# Patient Record
Sex: MALE | Race: WHITE | URBAN - METROPOLITAN AREA
[De-identification: names, ages, dates, MRNs, and addresses within clinical notes are randomized per-mention and may not be internally consistent; named-entity substitution may affect disease eponyms.]

---

## 2019-12-03 ENCOUNTER — PROCEDURE VISIT (OUTPATIENT)
Dept: SURGERY | Age: 64
End: 2019-12-03
Payer: MEDICARE

## 2019-12-03 VITALS
OXYGEN SATURATION: 98 % | BODY MASS INDEX: 24.29 KG/M2 | DIASTOLIC BLOOD PRESSURE: 83 MMHG | SYSTOLIC BLOOD PRESSURE: 161 MMHG | HEART RATE: 65 BPM | WEIGHT: 164 LBS | HEIGHT: 69 IN | TEMPERATURE: 97.8 F

## 2019-12-03 DIAGNOSIS — C44.319 BASAL CELL CARCINOMA OF FOREHEAD: Primary | ICD-10-CM

## 2019-12-03 PROBLEM — I10 HYPERTENSIVE DISORDER: Status: ACTIVE | Noted: 2019-12-03

## 2019-12-03 PROBLEM — F41.9 ANXIETY: Status: ACTIVE | Noted: 2019-12-03

## 2019-12-03 PROCEDURE — 17311 MOHS 1 STAGE H/N/HF/G: CPT | Performed by: DERMATOLOGY

## 2019-12-03 PROCEDURE — 12052 INTMD RPR FACE/MM 2.6-5.0 CM: CPT | Performed by: DERMATOLOGY

## 2019-12-03 RX ORDER — LISINOPRIL 20 MG/1
TABLET ORAL
Refills: 1 | COMMUNITY
Start: 2019-11-21

## 2019-12-04 ENCOUNTER — TELEPHONE (OUTPATIENT)
Dept: SURGERY | Age: 64
End: 2019-12-04

## 2023-10-16 ENCOUNTER — HOSPITAL ENCOUNTER (OUTPATIENT)
Dept: ULTRASOUND IMAGING | Age: 68
Discharge: HOME | End: 2023-10-16
Payer: MEDICARE

## 2023-10-16 DIAGNOSIS — R17 CHOLEMIA: ICD-10-CM

## 2023-10-16 PROCEDURE — 76700 US EXAM ABDOM COMPLETE: CPT

## 2024-06-06 ENCOUNTER — ANESTHESIA (OUTPATIENT)
Dept: OPERATING ROOM | Age: 69
End: 2024-06-06
Payer: MEDICARE

## 2024-06-06 ENCOUNTER — ANESTHESIA EVENT (OUTPATIENT)
Dept: OPERATING ROOM | Age: 69
End: 2024-06-06
Payer: MEDICARE

## 2024-06-06 ENCOUNTER — APPOINTMENT (OUTPATIENT)
Dept: CT IMAGING | Age: 69
DRG: 398 | End: 2024-06-06
Payer: MEDICARE

## 2024-06-06 ENCOUNTER — HOSPITAL ENCOUNTER (INPATIENT)
Age: 69
LOS: 2 days | Discharge: HOME | DRG: 398 | End: 2024-06-08
Attending: STUDENT IN AN ORGANIZED HEALTH CARE EDUCATION/TRAINING PROGRAM | Admitting: SURGERY
Payer: MEDICARE

## 2024-06-06 DIAGNOSIS — K35.200 ACUTE APPENDICITIS WITH GENERALIZED PERITONITIS WITHOUT GANGRENE, PERFORATION, OR ABSCESS: Primary | ICD-10-CM

## 2024-06-06 DIAGNOSIS — K35.30 ACUTE APPENDICITIS WITH LOCALIZED PERITONITIS, WITHOUT PERFORATION, ABSCESS, OR GANGRENE: ICD-10-CM

## 2024-06-06 DIAGNOSIS — E87.1 HYPONATREMIA: ICD-10-CM

## 2024-06-06 DIAGNOSIS — R33.9 URINARY RETENTION: ICD-10-CM

## 2024-06-06 PROBLEM — K35.32 ACUTE PERFORATED APPENDICITIS: Status: ACTIVE | Noted: 2024-06-06

## 2024-06-06 PROBLEM — K35.33 ACUTE APPENDICITIS WITH PERFORATION, LOCALIZED PERITONITIS, ABSCESS, AND GANGRENE: Status: ACTIVE | Noted: 2024-06-06

## 2024-06-06 LAB
ALBUMIN SERPL-MCNC: 4 G/DL (ref 3.4–5)
ALBUMIN/GLOB SERPL: 1.5 {RATIO} (ref 1.1–2.2)
ALP SERPL-CCNC: 50 U/L (ref 40–129)
ALT SERPL-CCNC: 14 U/L (ref 10–40)
ANION GAP SERPL CALCULATED.3IONS-SCNC: 8 MMOL/L (ref 3–16)
AST SERPL-CCNC: 20 U/L (ref 15–37)
BASOPHILS # BLD: 0 K/UL (ref 0–0.2)
BASOPHILS NFR BLD: 0.2 %
BILIRUB SERPL-MCNC: 1 MG/DL (ref 0–1)
BILIRUB UR QL STRIP.AUTO: NEGATIVE
BUN SERPL-MCNC: 11 MG/DL (ref 7–20)
CALCIUM SERPL-MCNC: 9 MG/DL (ref 8.3–10.6)
CHLORIDE SERPL-SCNC: 91 MMOL/L (ref 99–110)
CLARITY UR: CLEAR
CO2 SERPL-SCNC: 28 MMOL/L (ref 21–32)
COLOR UR: YELLOW
CREAT SERPL-MCNC: 0.9 MG/DL (ref 0.8–1.3)
DEPRECATED RDW RBC AUTO: 12.4 % (ref 12.4–15.4)
EOSINOPHIL # BLD: 0 K/UL (ref 0–0.6)
EOSINOPHIL NFR BLD: 0 %
GFR SERPLBLD CREATININE-BSD FMLA CKD-EPI: >90 ML/MIN/{1.73_M2}
GLUCOSE SERPL-MCNC: 112 MG/DL (ref 70–99)
GLUCOSE UR STRIP.AUTO-MCNC: NEGATIVE MG/DL
HCT VFR BLD AUTO: 35.9 % (ref 40.5–52.5)
HGB BLD-MCNC: 12.5 G/DL (ref 13.5–17.5)
HGB UR QL STRIP.AUTO: NEGATIVE
KETONES UR STRIP.AUTO-MCNC: ABNORMAL MG/DL
LEUKOCYTE ESTERASE UR QL STRIP.AUTO: NEGATIVE
LYMPHOCYTES # BLD: 0.3 K/UL (ref 1–5.1)
LYMPHOCYTES NFR BLD: 2.3 %
MCH RBC QN AUTO: 34 PG (ref 26–34)
MCHC RBC AUTO-ENTMCNC: 34.8 G/DL (ref 31–36)
MCV RBC AUTO: 97.9 FL (ref 80–100)
MONOCYTES # BLD: 1.3 K/UL (ref 0–1.3)
MONOCYTES NFR BLD: 10 %
NEUTROPHILS # BLD: 11.5 K/UL (ref 1.7–7.7)
NEUTROPHILS NFR BLD: 87.5 %
NITRITE UR QL STRIP.AUTO: NEGATIVE
PH UR STRIP.AUTO: 6.5 [PH] (ref 5–8)
PLATELET # BLD AUTO: 139 K/UL (ref 135–450)
PMV BLD AUTO: 7.2 FL (ref 5–10.5)
POTASSIUM SERPL-SCNC: 4.2 MMOL/L (ref 3.5–5.1)
PROT SERPL-MCNC: 6.6 G/DL (ref 6.4–8.2)
PROT UR STRIP.AUTO-MCNC: ABNORMAL MG/DL
RBC # BLD AUTO: 3.66 M/UL (ref 4.2–5.9)
RBC #/AREA URNS HPF: NORMAL /HPF (ref 0–4)
SODIUM SERPL-SCNC: 127 MMOL/L (ref 136–145)
SP GR UR STRIP.AUTO: 1.01 (ref 1–1.03)
UA COMPLETE W REFLEX CULTURE PNL UR: ABNORMAL
UA DIPSTICK W REFLEX MICRO PNL UR: YES
URN SPEC COLLECT METH UR: ABNORMAL
UROBILINOGEN UR STRIP-ACNC: 0.2 E.U./DL
WBC # BLD AUTO: 13.1 K/UL (ref 4–11)
WBC #/AREA URNS HPF: NORMAL /HPF (ref 0–5)

## 2024-06-06 PROCEDURE — 96375 TX/PRO/DX INJ NEW DRUG ADDON: CPT

## 2024-06-06 PROCEDURE — 2580000003 HC RX 258: Performed by: STUDENT IN AN ORGANIZED HEALTH CARE EDUCATION/TRAINING PROGRAM

## 2024-06-06 PROCEDURE — 99285 EMERGENCY DEPT VISIT HI MDM: CPT

## 2024-06-06 PROCEDURE — 7100000001 HC PACU RECOVERY - ADDTL 15 MIN: Performed by: SURGERY

## 2024-06-06 PROCEDURE — 96365 THER/PROPH/DIAG IV INF INIT: CPT

## 2024-06-06 PROCEDURE — 2580000003 HC RX 258: Performed by: PHYSICIAN ASSISTANT

## 2024-06-06 PROCEDURE — 74177 CT ABD & PELVIS W/CONTRAST: CPT

## 2024-06-06 PROCEDURE — 96376 TX/PRO/DX INJ SAME DRUG ADON: CPT

## 2024-06-06 PROCEDURE — 2580000003 HC RX 258

## 2024-06-06 PROCEDURE — 2500000003 HC RX 250 WO HCPCS

## 2024-06-06 PROCEDURE — 44970 LAPAROSCOPY APPENDECTOMY: CPT | Performed by: SURGERY

## 2024-06-06 PROCEDURE — 3600000004 HC SURGERY LEVEL 4 BASE: Performed by: SURGERY

## 2024-06-06 PROCEDURE — 51702 INSERT TEMP BLADDER CATH: CPT

## 2024-06-06 PROCEDURE — 2580000003 HC RX 258: Performed by: SURGERY

## 2024-06-06 PROCEDURE — 94761 N-INVAS EAR/PLS OXIMETRY MLT: CPT

## 2024-06-06 PROCEDURE — 96361 HYDRATE IV INFUSION ADD-ON: CPT

## 2024-06-06 PROCEDURE — 85025 COMPLETE CBC W/AUTO DIFF WBC: CPT

## 2024-06-06 PROCEDURE — 2720000010 HC SURG SUPPLY STERILE: Performed by: SURGERY

## 2024-06-06 PROCEDURE — A4217 STERILE WATER/SALINE, 500 ML: HCPCS | Performed by: SURGERY

## 2024-06-06 PROCEDURE — 80053 COMPREHEN METABOLIC PANEL: CPT

## 2024-06-06 PROCEDURE — 6360000002 HC RX W HCPCS: Performed by: SURGERY

## 2024-06-06 PROCEDURE — 3600000014 HC SURGERY LEVEL 4 ADDTL 15MIN: Performed by: SURGERY

## 2024-06-06 PROCEDURE — 2700000000 HC OXYGEN THERAPY PER DAY

## 2024-06-06 PROCEDURE — 3700000001 HC ADD 15 MINUTES (ANESTHESIA): Performed by: SURGERY

## 2024-06-06 PROCEDURE — 2709999900 HC NON-CHARGEABLE SUPPLY: Performed by: SURGERY

## 2024-06-06 PROCEDURE — 3700000000 HC ANESTHESIA ATTENDED CARE: Performed by: SURGERY

## 2024-06-06 PROCEDURE — 81001 URINALYSIS AUTO W/SCOPE: CPT

## 2024-06-06 PROCEDURE — 99222 1ST HOSP IP/OBS MODERATE 55: CPT | Performed by: SURGERY

## 2024-06-06 PROCEDURE — 88304 TISSUE EXAM BY PATHOLOGIST: CPT

## 2024-06-06 PROCEDURE — 2500000003 HC RX 250 WO HCPCS: Performed by: SURGERY

## 2024-06-06 PROCEDURE — 6360000002 HC RX W HCPCS

## 2024-06-06 PROCEDURE — 7100000000 HC PACU RECOVERY - FIRST 15 MIN: Performed by: SURGERY

## 2024-06-06 PROCEDURE — 1200000000 HC SEMI PRIVATE

## 2024-06-06 PROCEDURE — 6360000004 HC RX CONTRAST MEDICATION: Performed by: PHYSICIAN ASSISTANT

## 2024-06-06 PROCEDURE — 6360000002 HC RX W HCPCS: Performed by: PHYSICIAN ASSISTANT

## 2024-06-06 PROCEDURE — 6360000002 HC RX W HCPCS: Performed by: ANESTHESIOLOGY

## 2024-06-06 RX ORDER — HEPARIN SODIUM 5000 [USP'U]/ML
5000 INJECTION, SOLUTION INTRAVENOUS; SUBCUTANEOUS ONCE
Status: COMPLETED | OUTPATIENT
Start: 2024-06-06 | End: 2024-06-06

## 2024-06-06 RX ORDER — PROPOFOL 10 MG/ML
INJECTION, EMULSION INTRAVENOUS PRN
Status: DISCONTINUED | OUTPATIENT
Start: 2024-06-06 | End: 2024-06-06 | Stop reason: SDUPTHER

## 2024-06-06 RX ORDER — LIDOCAINE HYDROCHLORIDE 20 MG/ML
INJECTION, SOLUTION INFILTRATION; PERINEURAL PRN
Status: DISCONTINUED | OUTPATIENT
Start: 2024-06-06 | End: 2024-06-06 | Stop reason: SDUPTHER

## 2024-06-06 RX ORDER — OXYCODONE HYDROCHLORIDE 5 MG/1
10 TABLET ORAL PRN
Status: DISCONTINUED | OUTPATIENT
Start: 2024-06-06 | End: 2024-06-06 | Stop reason: HOSPADM

## 2024-06-06 RX ORDER — ONDANSETRON 2 MG/ML
4 INJECTION INTRAMUSCULAR; INTRAVENOUS EVERY 4 HOURS PRN
Status: DISCONTINUED | OUTPATIENT
Start: 2024-06-06 | End: 2024-06-08 | Stop reason: HOSPADM

## 2024-06-06 RX ORDER — DIPHENHYDRAMINE HYDROCHLORIDE 50 MG/ML
6.25 INJECTION INTRAMUSCULAR; INTRAVENOUS
Status: DISCONTINUED | OUTPATIENT
Start: 2024-06-06 | End: 2024-06-06 | Stop reason: HOSPADM

## 2024-06-06 RX ORDER — BUPIVACAINE HYDROCHLORIDE AND EPINEPHRINE 5; 5 MG/ML; UG/ML
INJECTION, SOLUTION PERINEURAL PRN
Status: DISCONTINUED | OUTPATIENT
Start: 2024-06-06 | End: 2024-06-06 | Stop reason: ALTCHOICE

## 2024-06-06 RX ORDER — ROCURONIUM BROMIDE 10 MG/ML
INJECTION, SOLUTION INTRAVENOUS PRN
Status: DISCONTINUED | OUTPATIENT
Start: 2024-06-06 | End: 2024-06-06 | Stop reason: SDUPTHER

## 2024-06-06 RX ORDER — ESMOLOL HYDROCHLORIDE 10 MG/ML
INJECTION INTRAVENOUS PRN
Status: DISCONTINUED | OUTPATIENT
Start: 2024-06-06 | End: 2024-06-06 | Stop reason: SDUPTHER

## 2024-06-06 RX ORDER — ACETAMINOPHEN 325 MG/1
650 TABLET ORAL EVERY 4 HOURS PRN
Status: DISCONTINUED | OUTPATIENT
Start: 2024-06-06 | End: 2024-06-08 | Stop reason: HOSPADM

## 2024-06-06 RX ORDER — OXYCODONE HYDROCHLORIDE AND ACETAMINOPHEN 5; 325 MG/1; MG/1
1 TABLET ORAL EVERY 4 HOURS PRN
Status: DISCONTINUED | OUTPATIENT
Start: 2024-06-06 | End: 2024-06-08 | Stop reason: HOSPADM

## 2024-06-06 RX ORDER — SUCCINYLCHOLINE CHLORIDE 20 MG/ML
INJECTION INTRAMUSCULAR; INTRAVENOUS PRN
Status: DISCONTINUED | OUTPATIENT
Start: 2024-06-06 | End: 2024-06-06 | Stop reason: SDUPTHER

## 2024-06-06 RX ORDER — MIDAZOLAM HYDROCHLORIDE 1 MG/ML
2 INJECTION INTRAMUSCULAR; INTRAVENOUS
Status: DISCONTINUED | OUTPATIENT
Start: 2024-06-06 | End: 2024-06-06 | Stop reason: HOSPADM

## 2024-06-06 RX ORDER — ONDANSETRON 2 MG/ML
INJECTION INTRAMUSCULAR; INTRAVENOUS PRN
Status: DISCONTINUED | OUTPATIENT
Start: 2024-06-06 | End: 2024-06-06 | Stop reason: SDUPTHER

## 2024-06-06 RX ORDER — MAGNESIUM HYDROXIDE 1200 MG/15ML
LIQUID ORAL CONTINUOUS PRN
Status: COMPLETED | OUTPATIENT
Start: 2024-06-06 | End: 2024-06-06

## 2024-06-06 RX ORDER — HYDROCHLOROTHIAZIDE 25 MG/1
12.5 TABLET ORAL DAILY
Status: DISCONTINUED | OUTPATIENT
Start: 2024-06-07 | End: 2024-06-08 | Stop reason: HOSPADM

## 2024-06-06 RX ORDER — ONDANSETRON 2 MG/ML
4 INJECTION INTRAMUSCULAR; INTRAVENOUS ONCE
Status: DISCONTINUED | OUTPATIENT
Start: 2024-06-06 | End: 2024-06-06 | Stop reason: HOSPADM

## 2024-06-06 RX ORDER — MEPERIDINE HYDROCHLORIDE 50 MG/ML
12.5 INJECTION INTRAMUSCULAR; INTRAVENOUS; SUBCUTANEOUS EVERY 5 MIN PRN
Status: DISCONTINUED | OUTPATIENT
Start: 2024-06-06 | End: 2024-06-06 | Stop reason: HOSPADM

## 2024-06-06 RX ORDER — HYDROMORPHONE HYDROCHLORIDE 2 MG/ML
INJECTION, SOLUTION INTRAMUSCULAR; INTRAVENOUS; SUBCUTANEOUS PRN
Status: DISCONTINUED | OUTPATIENT
Start: 2024-06-06 | End: 2024-06-06 | Stop reason: SDUPTHER

## 2024-06-06 RX ORDER — OXYCODONE HYDROCHLORIDE AND ACETAMINOPHEN 5; 325 MG/1; MG/1
2 TABLET ORAL EVERY 4 HOURS PRN
Status: DISCONTINUED | OUTPATIENT
Start: 2024-06-06 | End: 2024-06-08 | Stop reason: HOSPADM

## 2024-06-06 RX ORDER — 0.9 % SODIUM CHLORIDE 0.9 %
1000 INTRAVENOUS SOLUTION INTRAVENOUS ONCE
Status: COMPLETED | OUTPATIENT
Start: 2024-06-06 | End: 2024-06-06

## 2024-06-06 RX ORDER — LISINOPRIL 20 MG/1
20 TABLET ORAL DAILY
Status: DISCONTINUED | OUTPATIENT
Start: 2024-06-07 | End: 2024-06-08 | Stop reason: HOSPADM

## 2024-06-06 RX ORDER — MORPHINE SULFATE 2 MG/ML
2 INJECTION, SOLUTION INTRAMUSCULAR; INTRAVENOUS ONCE
Status: COMPLETED | OUTPATIENT
Start: 2024-06-06 | End: 2024-06-06

## 2024-06-06 RX ORDER — ONDANSETRON 2 MG/ML
4 INJECTION INTRAMUSCULAR; INTRAVENOUS ONCE
Status: COMPLETED | OUTPATIENT
Start: 2024-06-06 | End: 2024-06-06

## 2024-06-06 RX ORDER — HYDROCHLOROTHIAZIDE 12.5 MG/1
12.5 TABLET ORAL DAILY
COMMUNITY
Start: 2024-06-05

## 2024-06-06 RX ORDER — SODIUM CHLORIDE, SODIUM LACTATE, POTASSIUM CHLORIDE, CALCIUM CHLORIDE 600; 310; 30; 20 MG/100ML; MG/100ML; MG/100ML; MG/100ML
INJECTION, SOLUTION INTRAVENOUS CONTINUOUS PRN
Status: DISCONTINUED | OUTPATIENT
Start: 2024-06-06 | End: 2024-06-06 | Stop reason: SDUPTHER

## 2024-06-06 RX ORDER — SODIUM CHLORIDE 0.9 % (FLUSH) 0.9 %
5-40 SYRINGE (ML) INJECTION EVERY 12 HOURS SCHEDULED
Status: DISCONTINUED | OUTPATIENT
Start: 2024-06-06 | End: 2024-06-06 | Stop reason: HOSPADM

## 2024-06-06 RX ORDER — ENOXAPARIN SODIUM 100 MG/ML
40 INJECTION SUBCUTANEOUS DAILY
Status: DISCONTINUED | OUTPATIENT
Start: 2024-06-07 | End: 2024-06-08 | Stop reason: HOSPADM

## 2024-06-06 RX ORDER — SODIUM CHLORIDE 9 MG/ML
INJECTION, SOLUTION INTRAVENOUS CONTINUOUS
Status: DISCONTINUED | OUTPATIENT
Start: 2024-06-06 | End: 2024-06-08

## 2024-06-06 RX ORDER — PHENYLEPHRINE HCL IN 0.9% NACL 1 MG/10 ML
SYRINGE (ML) INTRAVENOUS PRN
Status: DISCONTINUED | OUTPATIENT
Start: 2024-06-06 | End: 2024-06-06 | Stop reason: SDUPTHER

## 2024-06-06 RX ORDER — SODIUM CHLORIDE 9 MG/ML
INJECTION, SOLUTION INTRAVENOUS PRN
Status: DISCONTINUED | OUTPATIENT
Start: 2024-06-06 | End: 2024-06-06 | Stop reason: HOSPADM

## 2024-06-06 RX ORDER — DIPHENHYDRAMINE HYDROCHLORIDE 50 MG/ML
12.5 INJECTION INTRAMUSCULAR; INTRAVENOUS EVERY 6 HOURS PRN
Status: DISCONTINUED | OUTPATIENT
Start: 2024-06-06 | End: 2024-06-08 | Stop reason: HOSPADM

## 2024-06-06 RX ORDER — HYDROMORPHONE HYDROCHLORIDE 1 MG/ML
1 INJECTION, SOLUTION INTRAMUSCULAR; INTRAVENOUS; SUBCUTANEOUS
Status: DISCONTINUED | OUTPATIENT
Start: 2024-06-06 | End: 2024-06-08 | Stop reason: HOSPADM

## 2024-06-06 RX ORDER — OXYCODONE HYDROCHLORIDE 5 MG/1
5 TABLET ORAL PRN
Status: DISCONTINUED | OUTPATIENT
Start: 2024-06-06 | End: 2024-06-06 | Stop reason: HOSPADM

## 2024-06-06 RX ORDER — DEXAMETHASONE SODIUM PHOSPHATE 4 MG/ML
INJECTION, SOLUTION INTRA-ARTICULAR; INTRALESIONAL; INTRAMUSCULAR; INTRAVENOUS; SOFT TISSUE PRN
Status: DISCONTINUED | OUTPATIENT
Start: 2024-06-06 | End: 2024-06-06 | Stop reason: SDUPTHER

## 2024-06-06 RX ORDER — SODIUM CHLORIDE 0.9 % (FLUSH) 0.9 %
5-40 SYRINGE (ML) INJECTION PRN
Status: DISCONTINUED | OUTPATIENT
Start: 2024-06-06 | End: 2024-06-06 | Stop reason: HOSPADM

## 2024-06-06 RX ORDER — NALOXONE HYDROCHLORIDE 0.4 MG/ML
INJECTION, SOLUTION INTRAMUSCULAR; INTRAVENOUS; SUBCUTANEOUS PRN
Status: DISCONTINUED | OUTPATIENT
Start: 2024-06-06 | End: 2024-06-06 | Stop reason: HOSPADM

## 2024-06-06 RX ADMIN — SODIUM CHLORIDE, SODIUM LACTATE, POTASSIUM CHLORIDE, AND CALCIUM CHLORIDE: .6; .31; .03; .02 INJECTION, SOLUTION INTRAVENOUS at 20:29

## 2024-06-06 RX ADMIN — SODIUM CHLORIDE 1000 ML: 9 INJECTION, SOLUTION INTRAVENOUS at 17:52

## 2024-06-06 RX ADMIN — HYDROMORPHONE HYDROCHLORIDE 0.5 MG: 1 INJECTION, SOLUTION INTRAMUSCULAR; INTRAVENOUS; SUBCUTANEOUS at 21:58

## 2024-06-06 RX ADMIN — SUCCINYLCHOLINE CHLORIDE 120 MG: 20 INJECTION, SOLUTION INTRAMUSCULAR; INTRAVENOUS at 20:33

## 2024-06-06 RX ADMIN — ROCURONIUM BROMIDE 10 MG: 50 INJECTION, SOLUTION INTRAVENOUS at 20:35

## 2024-06-06 RX ADMIN — ROCURONIUM BROMIDE 20 MG: 50 INJECTION, SOLUTION INTRAVENOUS at 20:46

## 2024-06-06 RX ADMIN — LIDOCAINE HYDROCHLORIDE 40 MG: 20 INJECTION, SOLUTION INFILTRATION; PERINEURAL at 21:19

## 2024-06-06 RX ADMIN — METHOCARBAMOL 500 MG: 100 INJECTION INTRAMUSCULAR; INTRAVENOUS at 21:15

## 2024-06-06 RX ADMIN — ONDANSETRON 4 MG: 2 INJECTION INTRAMUSCULAR; INTRAVENOUS at 20:40

## 2024-06-06 RX ADMIN — Medication 100 MCG: at 20:44

## 2024-06-06 RX ADMIN — MORPHINE SULFATE 2 MG: 2 INJECTION, SOLUTION INTRAMUSCULAR; INTRAVENOUS at 13:32

## 2024-06-06 RX ADMIN — ROCURONIUM BROMIDE 10 MG: 50 INJECTION, SOLUTION INTRAVENOUS at 21:04

## 2024-06-06 RX ADMIN — PROPOFOL 150 MG: 10 INJECTION, EMULSION INTRAVENOUS at 20:33

## 2024-06-06 RX ADMIN — PIPERACILLIN AND TAZOBACTAM 4500 MG: 4; .5 INJECTION, POWDER, LYOPHILIZED, FOR SOLUTION INTRAVENOUS at 16:02

## 2024-06-06 RX ADMIN — LIDOCAINE HYDROCHLORIDE 80 MG: 20 INJECTION, SOLUTION INFILTRATION; PERINEURAL at 20:33

## 2024-06-06 RX ADMIN — HYDROMORPHONE HYDROCHLORIDE 0.5 MG: 1 INJECTION, SOLUTION INTRAMUSCULAR; INTRAVENOUS; SUBCUTANEOUS at 22:10

## 2024-06-06 RX ADMIN — ESMOLOL HYDROCHLORIDE 20 MG: 100 INJECTION, SOLUTION INTRAVENOUS at 21:03

## 2024-06-06 RX ADMIN — SUGAMMADEX 200 MG: 100 INJECTION, SOLUTION INTRAVENOUS at 21:33

## 2024-06-06 RX ADMIN — PROPOFOL 50 MG: 10 INJECTION, EMULSION INTRAVENOUS at 21:00

## 2024-06-06 RX ADMIN — ESMOLOL HYDROCHLORIDE 30 MG: 100 INJECTION, SOLUTION INTRAVENOUS at 21:20

## 2024-06-06 RX ADMIN — HYDROMORPHONE HYDROCHLORIDE 0.5 MG: 2 INJECTION, SOLUTION INTRAMUSCULAR; INTRAVENOUS; SUBCUTANEOUS at 20:40

## 2024-06-06 RX ADMIN — DEXAMETHASONE SODIUM PHOSPHATE 8 MG: 4 INJECTION, SOLUTION INTRAMUSCULAR; INTRAVENOUS at 20:40

## 2024-06-06 RX ADMIN — ROCURONIUM BROMIDE 10 MG: 50 INJECTION, SOLUTION INTRAVENOUS at 20:33

## 2024-06-06 RX ADMIN — IOPAMIDOL 75 ML: 755 INJECTION, SOLUTION INTRAVENOUS at 14:43

## 2024-06-06 RX ADMIN — HEPARIN SODIUM 5000 UNITS: 5000 INJECTION INTRAVENOUS; SUBCUTANEOUS at 20:21

## 2024-06-06 RX ADMIN — SODIUM CHLORIDE 1000 ML: 9 INJECTION, SOLUTION INTRAVENOUS at 13:30

## 2024-06-06 RX ADMIN — MORPHINE SULFATE 2 MG: 2 INJECTION, SOLUTION INTRAMUSCULAR; INTRAVENOUS at 15:58

## 2024-06-06 RX ADMIN — ONDANSETRON 4 MG: 2 INJECTION INTRAMUSCULAR; INTRAVENOUS at 13:31

## 2024-06-06 RX ADMIN — SODIUM CHLORIDE: 9 INJECTION, SOLUTION INTRAVENOUS at 23:24

## 2024-06-06 RX ADMIN — PROPOFOL 20 MG: 10 INJECTION, EMULSION INTRAVENOUS at 21:33

## 2024-06-06 ASSESSMENT — PAIN SCALES - GENERAL
PAINLEVEL_OUTOF10: 2
PAINLEVEL_OUTOF10: 8
PAINLEVEL_OUTOF10: 1
PAINLEVEL_OUTOF10: 3
PAINLEVEL_OUTOF10: 8
PAINLEVEL_OUTOF10: 7
PAINLEVEL_OUTOF10: 8

## 2024-06-06 ASSESSMENT — PAIN - FUNCTIONAL ASSESSMENT
PAIN_FUNCTIONAL_ASSESSMENT: 0-10
PAIN_FUNCTIONAL_ASSESSMENT: 0-10

## 2024-06-06 ASSESSMENT — PAIN DESCRIPTION - LOCATION
LOCATION: ABDOMEN

## 2024-06-06 ASSESSMENT — PAIN DESCRIPTION - DESCRIPTORS: DESCRIPTORS: ACHING

## 2024-06-06 ASSESSMENT — PAIN DESCRIPTION - PAIN TYPE: TYPE: ACUTE PAIN

## 2024-06-06 ASSESSMENT — PAIN DESCRIPTION - ORIENTATION: ORIENTATION: RIGHT;UPPER

## 2024-06-06 NOTE — ED NOTES
@6106 paged Dr. Scales per Thad ZAPATA   RE: appendicitis  Repaged Dr. Scales at 6739  @2190 Dr. Simmons called back and spoke with Thad ZAPATA

## 2024-06-06 NOTE — ANESTHESIA PRE PROCEDURE
Department of Anesthesiology  Preprocedure Note       Name:  Ulisses Mcghee   Age:  68 y.o.  :  1955                                          MRN:  [id number]         Date:  2024      Surgeon: Surgeon(s):  Ted Scales MD    Procedure: Procedure(s):  APPENDECTOMY LAPAROSCOPIC ROBOTIC    Medications prior to admission:   Prior to Admission medications    Medication Sig Start Date End Date Taking? Authorizing Provider   lisinopril (PRINIVIL;ZESTRIL) 20 MG tablet  19   Provider, MD Teresa       Current medications:    No current facility-administered medications for this encounter.       Allergies:  No Known Allergies    Problem List:    Patient Active Problem List   Diagnosis Code   • Hypertensive disorder I10   • Anxiety F41.9   • Acute appendicitis with localized peritonitis, without perforation, abscess, or gangrene K35.30       Past Medical History:        Diagnosis Date   • Hypertension        Past Surgical History:        Procedure Laterality Date   • SHOULDER SURGERY      Left side       Social History:    Social History     Tobacco Use   • Smoking status: Some Days     Types: Cigars   • Smokeless tobacco: Never   Substance Use Topics   • Alcohol use: Yes                                Ready to quit: Not Answered  Counseling given: Not Answered      Vital Signs (Current):   Vitals:    24 1658 24 1728 24 1831 24 1929   BP: 118/65 105/67 133/64 131/73   Pulse: 83 83 83 81   Resp: 23  20   Temp:    98.8 °F (37.1 °C)   TempSrc:       SpO2: 92% 94% 93% 93%   Weight:       Height:                                                  BP Readings from Last 3 Encounters:   24 131/73   19 (!) 161/83       NPO Status:                                                                                 BMI:   Wt Readings from Last 3 Encounters:   24 69.4 kg (153 lb)   19 74.4 kg (164 lb)     Body mass index is 23.26 kg/m².    CBC:   Lab Results

## 2024-06-06 NOTE — ED PROVIDER NOTES
[facility]  ED  EMERGENCY DEPARTMENT ENCOUNTER        Pt Name: Ulisses Mcghee  MRN: [id number]  Birthdate 1955  Date of evaluation: 6/6/2024  Provider: ZULEYMA MARTINEZ PA-C  PCP: Mireya Chaudhary APRN - NP  ED Attending: Yessenia Quintanilla MD       I have seen and evaluated this patient with my supervising physician Yessenia Quintanilla MD.    CHIEF COMPLAINT:     Chief Complaint   Patient presents with    Abdominal Pain     RUQ abdominal pain since Monday. Pain is worse with movement. Denies any nausea, vomiting or diarrhea. States his abdomen is bloated as well.        HISTORY OF PRESENT ILLNESS:      History provided by the patient. No limitations.    Ulisses Mcghee is a 68 y.o. male who arrives to the ED by private vehicle.  He is accompanied by his wife.  This patient is here complaining of right lower quadrant pain that began yesterday but has gradually worsened.  Now has exquisite pain with activity.  He reports anorexia, mild nausea though no vomiting or bowel changes.  He feels his abdomen is distended.  He has not had anything to eat and drink minimally today.  He is not anticoagulated.  He denies past abdominal or pelvic surgeries.    Nursing Notes were reviewed     REVIEW OF SYSTEMS:     Review of Systems  Positives and pertinent negatives as per HPI.      PAST MEDICAL HISTORY:     Past Medical History:   Diagnosis Date    Hypertension        SURGICAL HISTORY:      Past Surgical History:   Procedure Laterality Date    SHOULDER SURGERY      Left side       CURRENT MEDICATIONS:       Previous Medications    LISINOPRIL (PRINIVIL;ZESTRIL) 20 MG TABLET           ALLERGIES:    Patient has no known allergies.    FAMILY HISTORY:     @FAMX    SOCIAL HISTORY:       Social History     Socioeconomic History    Marital status:      Spouse name: None    Number of children: None    Years of education: None    Highest education level: None   Tobacco Use    Smoking status: Some Days     Types:  completed with a voice recognition program.  Efforts were made to edit the dictations, but occasionally words are mis-transcribed.)    ANASTACIA SUÁREZC (electronicallysigned)              Thad Gil, PA  06/06/24 0662

## 2024-06-06 NOTE — H&P
Department of General Surgery - Adult   History and Physical      PATIENT NAME: Ulisses Mcghee   YOB: 1955    ADMISSION DATE: 26/6/2024 12:04 PM      TODAY'S DATE: 6/6/2024    CHIEF COMPLAINT:  Abdominal pain    Historian: patient    HISTORY OF PRESENT ILLNESS:  The patient is a 68 y.o. male  who presents with 3 days of steadily increasing lower abdominal pain which has concentrated in the RLQ in past 24 hours.  No fever, chills, nausea, vomiting, but decreased appetite.  No prior h/o similar sx.  Seen in ED, notes mild leukocytosis, peritoneal sx on exam, and extensive inflammatory process in the right lower quadrant on CT, consistent with appendicitis.    Past Medical History:        Diagnosis Date    Hypertension        Past Surgical History:        Procedure Laterality Date    SHOULDER SURGERY      Left side       Medications Prior to Admission:   Prior to Admission medications    Medication Sig Start Date End Date Taking? Authorizing Provider   hydroCHLOROthiazide 12.5 MG tablet Take 1 tablet by mouth daily 6/5/24  Yes Provider, MD Teresa   lisinopril (PRINIVIL;ZESTRIL) 20 MG tablet  11/21/19   Provider, MD Teresa       Allergies:  Patient has no known allergies.    Social History:   TOBACCO:   reports that he has been smoking cigars. He has never used smokeless tobacco.  ETOH:   reports current alcohol use.  DRUGS:   has no history on file for drug use.  MARITAL STATUS:    OCCUPATION:  Retired  Patient currently lives with family      Family History:   History reviewed. No pertinent family history.    REVIEW OF SYSTEMS:    CONSTITUTIONAL:  positive for  malaise and anorexia  HEENT:  Negative  RESPIRATORY:  negative  CARDIOVASCULAR:  negative  GASTROINTESTINAL:  positive for abdominal pain  GENITOURINARY:  negative  HEMATOLOGIC/LYMPHATIC:  negative  ENDOCRINE:  Negative  NEUROLOGICAL:  Negative  * All other ROS reviewed and negative.     PHYSICAL EXAM:    VITALS:  /73  gland is enlarged with a critical TURP.  The bladder is  markedly distended.     Peritoneum/Retroperitoneum: No retroperitoneal adenopathy by size criteria.     Bones/Soft Tissues: Mild degenerative changes throughout the spine.     IMPRESSION:  Diffuse inflammation in the right lower quadrant without definable abscess or  free air.  This surrounds the cecum, appendix and terminal ileum.  This  likely reflects acute appendicitis but inflammation of the terminal ileum and  cecum should also be considered in the correct clinical setting.         ASSESSMENT AND PLAN:  Acute Appendicitis: pt's history, physical and radiographic findings are most consistent with acute appendicitis. The pt will go to OR for laparoscopic appendectomy with possible open procedure. Risk, benefits and alternatives of procedure have been discussed with patient and/or family and they seem to understand and agree to proceed. Pt understands and agrees to proceed. Appropriate pre-op antibiotics have been ordered.         PRACTITIONER CERTIFICATION   I certify that Ulisses Mcghee is expected to be hospitalized for <2 days based on the above assessment and plan.      Electronically signed by AWILDA WEBB MD     [facility]  [id number]

## 2024-06-06 NOTE — ED PROVIDER NOTES
THIS IS MY MARU SUPERVISORY AND SHARED VISIT NOTE:    I personally saw the patient and made/approved the management plan and take responsibility for the patient management.    Labs Reviewed   CBC WITH AUTO DIFFERENTIAL - Abnormal; Notable for the following components:       Result Value    WBC 13.1 (*)     RBC 3.66 (*)     Hemoglobin 12.5 (*)     Hematocrit 35.9 (*)     Neutrophils Absolute 11.5 (*)     Lymphocytes Absolute 0.3 (*)     All other components within normal limits   COMPREHENSIVE METABOLIC PANEL W/ REFLEX TO MG FOR LOW K - Abnormal; Notable for the following components:    Sodium 127 (*)     Chloride 91 (*)     Glucose 112 (*)     All other components within normal limits   URINALYSIS WITH REFLEX TO CULTURE - Abnormal; Notable for the following components:    Ketones, Urine TRACE (*)     Protein, UA TRACE (*)     All other components within normal limits   MICROSCOPIC URINALYSIS     CT ABDOMEN PELVIS W IV CONTRAST Additional Contrast? None   Final Result   Diffuse inflammation in the right lower quadrant without definable abscess or   free air.  This surrounds the cecum, appendix and terminal ileum.  This   likely reflects acute appendicitis but inflammation of the terminal ileum and   cecum should also be considered in the correct clinical setting.           History: Patient is a 68-year-old male, presenting with concerns for right lower quadrant abdominal pain worse with movement, abdominal bloating.  Patient states that symptoms started yesterday.  He denies any fevers.  Denies any other complaints or concerns.    Exam: Patient is resting comfortably and is in no acute distress.  Heart regular rate rhythm.  Lungs clear to auscultation bilaterally.  Abdomen soft, nondistended, significant tenderness to palpation in the right mid and right lower abdomen with some mild associated guarding.    MDM: Patient is a 68-year-old male, presenting with 1 day history of right lower quadrant abdominal pain.  Upon  arrival in the ED, vitals reassuring.  Patient was treated with pain medications and nausea medications here in the ED with some improvement of his symptoms.  CT abdomen pelvis shows diffuse inflammation in the right lower quadrant without definable abscess or free air surrounding the cecum appendix and terminal ileum.  Possibly indicative of acute appendicitis.  General surgery was consulted and patient was started on Zosyn.  Was also found to have urinary retention for which Zuniga catheter was placed.  He will be hospitalized for further workup and treatment of his condition.  He is comfortable and in agreement with plan of care.    I, Dr. Quintanilla, am the primary clinician of record.     1. Acute appendicitis with generalized peritonitis without gangrene, perforation, or abscess    2. Hyponatremia    3. Urinary retention    4. Acute appendicitis with localized peritonitis, without perforation, abscess, or gangrene      Comment: Please note this report has been produced using speech recognition software and may contain errors related to that system including errors in grammar, punctuation, and spelling, as well as words and phrases that may be inappropriate. If there are any questions or concerns please feel free to contact the dictating provider for clarification.       Yessenia Quintanilla MD  06/06/24 1952

## 2024-06-07 LAB
ANION GAP SERPL CALCULATED.3IONS-SCNC: 10 MMOL/L (ref 3–16)
BASOPHILS # BLD: 0 K/UL (ref 0–0.2)
BASOPHILS NFR BLD: 0 %
BUN SERPL-MCNC: 12 MG/DL (ref 7–20)
CALCIUM SERPL-MCNC: 8.1 MG/DL (ref 8.3–10.6)
CHLORIDE SERPL-SCNC: 98 MMOL/L (ref 99–110)
CO2 SERPL-SCNC: 24 MMOL/L (ref 21–32)
CREAT SERPL-MCNC: 1 MG/DL (ref 0.8–1.3)
DEPRECATED RDW RBC AUTO: 12.3 % (ref 12.4–15.4)
EOSINOPHIL # BLD: 0 K/UL (ref 0–0.6)
EOSINOPHIL NFR BLD: 0 %
GFR SERPLBLD CREATININE-BSD FMLA CKD-EPI: 82 ML/MIN/{1.73_M2}
GLUCOSE SERPL-MCNC: 122 MG/DL (ref 70–99)
HCT VFR BLD AUTO: 35.7 % (ref 40.5–52.5)
HGB BLD-MCNC: 12.3 G/DL (ref 13.5–17.5)
LYMPHOCYTES # BLD: 0.2 K/UL (ref 1–5.1)
LYMPHOCYTES NFR BLD: 2.3 %
MCH RBC QN AUTO: 34.7 PG (ref 26–34)
MCHC RBC AUTO-ENTMCNC: 34.5 G/DL (ref 31–36)
MCV RBC AUTO: 100.7 FL (ref 80–100)
MONOCYTES # BLD: 0.6 K/UL (ref 0–1.3)
MONOCYTES NFR BLD: 5.9 %
NEUTROPHILS # BLD: 9.2 K/UL (ref 1.7–7.7)
NEUTROPHILS NFR BLD: 91.8 %
PLATELET # BLD AUTO: 111 K/UL (ref 135–450)
PMV BLD AUTO: 7.3 FL (ref 5–10.5)
POTASSIUM SERPL-SCNC: 4.5 MMOL/L (ref 3.5–5.1)
RBC # BLD AUTO: 3.55 M/UL (ref 4.2–5.9)
SODIUM SERPL-SCNC: 132 MMOL/L (ref 136–145)
WBC # BLD AUTO: 10 K/UL (ref 4–11)

## 2024-06-07 PROCEDURE — 94761 N-INVAS EAR/PLS OXIMETRY MLT: CPT

## 2024-06-07 PROCEDURE — 0DTJ4ZZ RESECTION OF APPENDIX, PERCUTANEOUS ENDOSCOPIC APPROACH: ICD-10-PCS | Performed by: SURGERY

## 2024-06-07 PROCEDURE — 2700000000 HC OXYGEN THERAPY PER DAY

## 2024-06-07 PROCEDURE — 85025 COMPLETE CBC W/AUTO DIFF WBC: CPT

## 2024-06-07 PROCEDURE — 2580000003 HC RX 258: Performed by: SURGERY

## 2024-06-07 PROCEDURE — 99024 POSTOP FOLLOW-UP VISIT: CPT | Performed by: SURGERY

## 2024-06-07 PROCEDURE — 8E0W4CZ ROBOTIC ASSISTED PROCEDURE OF TRUNK REGION, PERCUTANEOUS ENDOSCOPIC APPROACH: ICD-10-PCS | Performed by: SURGERY

## 2024-06-07 PROCEDURE — 80048 BASIC METABOLIC PNL TOTAL CA: CPT

## 2024-06-07 PROCEDURE — 1200000000 HC SEMI PRIVATE

## 2024-06-07 PROCEDURE — APPNB45 APP NON BILLABLE 31-45 MINUTES: Performed by: CLINICAL NURSE SPECIALIST

## 2024-06-07 PROCEDURE — 6370000000 HC RX 637 (ALT 250 FOR IP): Performed by: PHYSICIAN ASSISTANT

## 2024-06-07 PROCEDURE — 6360000002 HC RX W HCPCS: Performed by: SURGERY

## 2024-06-07 PROCEDURE — 6370000000 HC RX 637 (ALT 250 FOR IP): Performed by: SURGERY

## 2024-06-07 RX ORDER — TAMSULOSIN HYDROCHLORIDE 0.4 MG/1
0.4 CAPSULE ORAL DAILY
Status: DISCONTINUED | OUTPATIENT
Start: 2024-06-07 | End: 2024-06-08 | Stop reason: HOSPADM

## 2024-06-07 RX ADMIN — PIPERACILLIN AND TAZOBACTAM 3375 MG: 3; .375 INJECTION, POWDER, LYOPHILIZED, FOR SOLUTION INTRAVENOUS at 00:18

## 2024-06-07 RX ADMIN — HYDROMORPHONE HYDROCHLORIDE 0.5 MG: 1 INJECTION, SOLUTION INTRAMUSCULAR; INTRAVENOUS; SUBCUTANEOUS at 04:09

## 2024-06-07 RX ADMIN — LISINOPRIL 20 MG: 20 TABLET ORAL at 08:44

## 2024-06-07 RX ADMIN — HYDROMORPHONE HYDROCHLORIDE 1 MG: 1 INJECTION, SOLUTION INTRAMUSCULAR; INTRAVENOUS; SUBCUTANEOUS at 22:07

## 2024-06-07 RX ADMIN — DIPHENHYDRAMINE HYDROCHLORIDE 12.5 MG: 50 INJECTION INTRAMUSCULAR; INTRAVENOUS at 00:06

## 2024-06-07 RX ADMIN — HYDROMORPHONE HYDROCHLORIDE 1 MG: 1 INJECTION, SOLUTION INTRAMUSCULAR; INTRAVENOUS; SUBCUTANEOUS at 15:23

## 2024-06-07 RX ADMIN — HYDROCHLOROTHIAZIDE 12.5 MG: 25 TABLET ORAL at 08:44

## 2024-06-07 RX ADMIN — PIPERACILLIN AND TAZOBACTAM 3375 MG: 3; .375 INJECTION, POWDER, LYOPHILIZED, FOR SOLUTION INTRAVENOUS at 16:33

## 2024-06-07 RX ADMIN — HYDROMORPHONE HYDROCHLORIDE 0.5 MG: 1 INJECTION, SOLUTION INTRAMUSCULAR; INTRAVENOUS; SUBCUTANEOUS at 00:29

## 2024-06-07 RX ADMIN — ENOXAPARIN SODIUM 40 MG: 100 INJECTION SUBCUTANEOUS at 08:44

## 2024-06-07 RX ADMIN — TAMSULOSIN HYDROCHLORIDE 0.4 MG: 0.4 CAPSULE ORAL at 12:09

## 2024-06-07 RX ADMIN — OXYCODONE AND ACETAMINOPHEN 2 TABLET: 5; 325 TABLET ORAL at 19:29

## 2024-06-07 RX ADMIN — SODIUM CHLORIDE: 9 INJECTION, SOLUTION INTRAVENOUS at 15:23

## 2024-06-07 RX ADMIN — HYDROMORPHONE HYDROCHLORIDE 0.5 MG: 1 INJECTION, SOLUTION INTRAMUSCULAR; INTRAVENOUS; SUBCUTANEOUS at 08:14

## 2024-06-07 RX ADMIN — OXYCODONE AND ACETAMINOPHEN 2 TABLET: 5; 325 TABLET ORAL at 12:05

## 2024-06-07 RX ADMIN — PIPERACILLIN AND TAZOBACTAM 3375 MG: 3; .375 INJECTION, POWDER, LYOPHILIZED, FOR SOLUTION INTRAVENOUS at 08:50

## 2024-06-07 ASSESSMENT — PAIN DESCRIPTION - ORIENTATION
ORIENTATION: RIGHT;LEFT;MID

## 2024-06-07 ASSESSMENT — PAIN DESCRIPTION - LOCATION
LOCATION: ABDOMEN
LOCATION: INCISION
LOCATION: INCISION;ABDOMEN
LOCATION: INCISION
LOCATION: INCISION
LOCATION: ABDOMEN

## 2024-06-07 ASSESSMENT — PAIN DESCRIPTION - DESCRIPTORS
DESCRIPTORS: SORE

## 2024-06-07 ASSESSMENT — PAIN SCALES - GENERAL
PAINLEVEL_OUTOF10: 10
PAINLEVEL_OUTOF10: 4
PAINLEVEL_OUTOF10: 9
PAINLEVEL_OUTOF10: 7
PAINLEVEL_OUTOF10: 8
PAINLEVEL_OUTOF10: 7
PAINLEVEL_OUTOF10: 4
PAINLEVEL_OUTOF10: 4
PAINLEVEL_OUTOF10: 8

## 2024-06-07 NOTE — OP NOTE
[address] [address]                            OPERATIVE REPORT      PATIENT NAME: ANIRUDH ROBBINS                  : 1955  MED REC NO: [id number]                      ROOM: 0346  ACCOUNT NO: [id number]                       ADMIT DATE: 2024  PROVIDER: Ted Scales MD      DATE OF PROCEDURE:  2024    SURGEON:  Ted Scales MD    PREOPERATIVE DIAGNOSIS:  Acute appendicitis.    POSTOPERATIVE DIAGNOSIS:  Acute perforated appendicitis with abscess.    PROCEDURE:  Laparoscopic robotic appendectomy.    ANESTHESIA:  General.    ESTIMATED BLOOD LOSS:  Minimal.    INDICATIONS:  The patient is a 68-year-old gentleman who presented with abdominal pain and was found to have acute appendicitis.  He is brought for appendectomy.    OPERATIVE SUMMARY:  After preoperative evaluation, the patient was brought to the operating suite and placed in a comfortable supine position on the operating room table.  Monitoring equipment was attached and general anesthesia was induced.  His abdomen was sterilely prepped and draped and a small lower midline incision was made.  This was dissected down to the fascia, and a suture of 0 Ethibond was placed on either side of the midline.  The midline fascia was opened and the peritoneal cavity was entered directly.  A Justus trocar was inserted, and the abdomen was insufflated to a pressure of 15 mmHg.  The remaining ports were placed under direct internal visualization.  He was placed in reverse Trendelenburg, rotated to the left and the robot was docked.  The right lower quadrant was seen to be markedly inflamed and there were some omental adhesions that were peeled off.  This revealed an abscess cavity with murky brown fluid.  This was evacuated with a suction  and the appendix was dissected free of the surrounding structures and elevated.  The base of the appendix appeared to be  relatively uninvolved with the inflammatory process.  Using cautery, the mesoappendix was divided down to the base.  The base was suture ligated with a 2-0 silk suture and the appendix was divided distally and placed in an Endopouch.  The stump of the appendix was cauterized to obliterate the exposed mucosa.  The right lower quadrant was copiously irrigated, and the fluid was evacuated.  A 10 mm flat channel drain was placed in the right lower quadrant and brought out through the left inferior 8 mm trocar site.  The robot was then undocked, the trocars were withdrawn, and the pneumoperitoneum was let down.  The drain was secured with an 0 Ethibond suture.  The lower midline fascial defect was closed with the previously placed figure-of-eight suture of 0 Ethibond after removing the appendix in the Endopouch.  The wounds were injected with Marcaine, and the skin incisions closed with subcuticular sutures of 4-0 Vicryl followed by Dermabond.  All sponge, needle, and instrument counts were correct at the end of the case.  The patient tolerated the procedure well and was taken to the recovery area in a stable condition.          EM GRULLON MD      D:  06/06/2024 21:30:13     T:  06/07/2024 01:50:48     DHRUV/AUDIES  Job #:  [id number]     Doc#:  [id number]    CC:   Mireya Chaudhary NP

## 2024-06-07 NOTE — ADDENDUM NOTE
Addendum  created 06/06/24 3229 by Nico Levine MD    Intraprocedure Event edited, Intraprocedure Staff edited

## 2024-06-07 NOTE — CONSULTS
Urology Consult Note      Reason for Consultation: urinary retention     Chief Complaint: \"trouble voiding for 6 months, slowly getting worse\"  HPI:  Ulisses is a 68 y.o. male with HTN who presented to [facility] on 6/6/24 with 24 hours of RLQ and was found to have acute perforated appendicitis and underwent an appendectomy late last evening.  In the ER he had  trouble voiding and his PVR was 300, so a mai was placed.  Review of his CT (prior to mai placement) shows a large prostate bulging into the bladder and a distended bladder.       Of note, he was seen by Dr Tobin in 2019 for an elevated PSA, on repeat check it had improved, he has not been seen since that time     Past Medical History:   Diagnosis Date    Hypertension        Past Surgical History:   Procedure Laterality Date    LAPAROSCOPIC APPENDECTOMY N/A 6/6/2024    APPENDECTOMY LAPAROSCOPIC ROBOTIC performed by Ted Scales MD at [facility] OR    SHOULDER SURGERY      Left side       Medication List reviewed:      Current Facility-Administered Medications   Medication Dose Route Frequency Provider Last Rate Last Admin    hydroCHLOROthiazide (HYDRODIURIL) tablet 12.5 mg  12.5 mg Oral Daily Ted Scales MD   12.5 mg at 06/07/24 0844    lisinopril (PRINIVIL;ZESTRIL) tablet 20 mg  20 mg Oral Daily Ted Scales MD   20 mg at 06/07/24 0844    0.9 % sodium chloride infusion   IntraVENous Continuous Ted Scales MD 75 mL/hr at 06/06/24 2324 New Bag at 06/06/24 2324    piperacillin-tazobactam (ZOSYN) 3,375 mg in sodium chloride 0.9 % 50 mL IVPB (mini-bag)  3,375 mg IntraVENous Q8H Ted Scales MD 12.5 mL/hr at 06/07/24 0850 3,375 mg at 06/07/24 0850    enoxaparin (LOVENOX) injection 40 mg  40 mg SubCUTAneous Daily Ted Scales MD   40 mg at 06/07/24 0844    HYDROmorphone HCl PF (DILAUDID) injection 1 mg  1 mg IntraVENous Q2H PRN Ted Scales MD        Or    HYDROmorphone (DILAUDID) injection 0.5 mg  0.5 mg        Neelima Barr PA-C  6/7/2024

## 2024-06-07 NOTE — CONSULTS
Prior patient of Dr. Recio  Consult called into [facility]  Time: 8906  Date: 06/07    Consult called by: Lauren Almendarez

## 2024-06-07 NOTE — CARE COORDINATION
with any other family members/significant others, and if so, who? No  Plans to Return to Present Housing:    Other Identified Issues/Barriers to RETURNING to current housing: none  Potential Assistance needed at discharge: N/A            Potential DME:    Patient expects to discharge to: House  Plan for transportation at discharge:      Financial    Payor: MEDICARE / Plan: MEDICARE PART A AND B / Product Type: *No Product type* /     Does insurance require precert for SNF: No    Potential assistance Purchasing Medications: No  Meds-to-Beds request: Yes      [facility] - [place], [place] - [address] - P [phone] - F [phone]  [address] [address]  Phone: [phone] Fax: [phone]      Notes:    Factors facilitating achievement of predicted outcomes: Family support, Motivated, Cooperative, Pleasant, Sense of humor, and Good insight into deficits    Barriers to discharge: none    Additional Case Management Notes: spoke with patient. Reported lives in home with wife. IPTA, uses no DME. Will be able to get to any follow up appts. Denied any DCP needs. Clark Jacome RN      The Plan for Transition of Care is related to the following treatment goals of Urinary retention [R33.9]  Hyponatremia [E87.1]  Acute appendicitis with localized peritonitis, without perforation, abscess, or gangrene [K35.30]  Acute appendicitis with generalized peritonitis without gangrene, perforation, or abscess [K35.200]  Acute perforated appendicitis [K35.32]    IF APPLICABLE: The Patient and/or patient representative Ulisses and his family were provided with a choice of provider and agrees with the discharge plan. Freedom of choice list with basic dialogue that supports the patient's individualized plan of care/goals and shares the quality data associated with the providers was provided to:     Patient Representative Name:       The Patient and/or Patient Representative Agree with the Discharge  Plan?      Clark Jacome RN  Case Management Department

## 2024-06-07 NOTE — BRIEF OP NOTE
Brief Postoperative Note      Patient: Ulisses Mcghee  YOB: 1955  MRN: [id number]    Date of Procedure: 6/6/2024    Pre-Op Diagnosis Codes:     * Acute appendicitis with localized peritonitis, without perforation, abscess, or gangrene [K35.30]    Post-Op Diagnosis:  acute perforated appendicitis with abscess       Procedure(s):  APPENDECTOMY LAPAROSCOPIC ROBOTIC    Surgeon(s):  Em Scales MD    Assistant:  Surgical Assistant: Ivania Yan    Anesthesia: General    Estimated Blood Loss (mL): Minimal    Complications: None    Specimens:   ID Type Source Tests Collected by Time Destination   A : appendix Tissue Appendix SURGICAL PATHOLOGY Em Scales MD 6/6/2024 2119        Implants:  * No implants in log *      Drains:   Urinary Catheter 06/06/24 Zuniga-Temperature (Active)   $ Urethral catheter insertion Inserted for procedure 06/06/24 1637   Catheter Indications Urinary retention (acute or chronic), continuous bladder irrigation or bladder outlet obstruction 06/06/24 1637   Site Assessment Pink 06/06/24 2000   Urine Color Yellow 06/06/24 2000   Urine Appearance Clear 06/06/24 2000   Collection Container Standard 06/06/24 1637   Securement Method Securing device (Describe) 06/06/24 1637   Catheter Best Practices  Bag not on floor;Bag below bladder;Drainage tube clipped to bed 06/06/24 2000   Output (mL) 900 mL 06/06/24 2000       Findings:  Infection Present At Time Of Surgery (PATOS) (choose all levels that have infection present):  - Organ Space infection (below fascia) present as evidenced by abscess and perforated acute appendicitis  Other Findings: as above    Electronically signed by EM SCALES MD on 6/6/2024 at 9:26 PM

## 2024-06-07 NOTE — PLAN OF CARE
Problem: Pain  Goal: Verbalizes/displays adequate comfort level or baseline comfort level  Outcome: Progressing  Flowsheets (Taken 6/7/2024 0330)  Verbalizes/displays adequate comfort level or baseline comfort level:   Encourage patient to monitor pain and request assistance   Assess pain using appropriate pain scale   Administer analgesics based on type and severity of pain and evaluate response   Implement non-pharmacological measures as appropriate and evaluate response     Problem: Discharge Planning  Goal: Discharge to home or other facility with appropriate resources  Outcome: Progressing  Flowsheets (Taken 6/7/2024 0330)  Discharge to home or other facility with appropriate resources:   Identify barriers to discharge with patient and caregiver   Arrange for needed discharge resources and transportation as appropriate   Arrange for interpreters to assist at discharge as needed   Identify discharge learning needs (meds, wound care, etc)     Problem: ABCDS Injury Assessment  Goal: Absence of physical injury  Outcome: Progressing  Flowsheets (Taken 6/7/2024 0330)  Absence of Physical Injury: Implement safety measures based on patient assessment     Problem: Skin/Tissue Integrity  Goal: Absence of new skin breakdown  Description: 1.  Monitor for areas of redness and/or skin breakdown  2.  Assess vascular access sites hourly  3.  Every 4-6 hours minimum:  Change oxygen saturation probe site  4.  Every 4-6 hours:  If on nasal continuous positive airway pressure, respiratory therapy assess nares and determine need for appliance change or resting period.  Outcome: Progressing

## 2024-06-07 NOTE — ANESTHESIA POSTPROCEDURE EVALUATION
Department of Anesthesiology  Postprocedure Note    Patient: Ulisses Mcghee  MRN: [id number]  YOB: 1955  Date of evaluation: 6/6/2024    Procedure Summary       Date: 06/06/24 Room / Location: [address]    Anesthesia Start: 2029 Anesthesia Stop: 2146    Procedure: APPENDECTOMY LAPAROSCOPIC ROBOTIC Diagnosis:       Acute appendicitis with localized peritonitis, without perforation, abscess, or gangrene      (Acute appendicitis with localized peritonitis, without perforation, abscess, or gangrene [K35.30])    Surgeons: Ted Scales MD Responsible Provider: Nico Levine MD    Anesthesia Type: general ASA Status: 2 - Emergent            Anesthesia Type: No value filed.    Roosevelt Phase I: Roosevelt Score: 10    Roosevelt Phase II:      Anesthesia Post Evaluation    Comments: Anes Post-op Note    Name:    Ulisses Mcghee  MRN:      [id number]    Patient Vitals in the past 12 hrs:  06/06/24 2250, BP:120/65, Temp:97.8 °F (36.6 °C), Temp src:Oral, Pulse:61, Resp:18, SpO2:97 %  06/06/24 2210, BP:120/64, Pulse:78, Resp:17, SpO2:90 %  06/06/24 2205, BP:105/86, Pulse:78, Resp:20, SpO2:(!) 88 %  06/06/24 2200, BP:120/65, Pulse:84, Resp:22, SpO2:90 %  06/06/24 2156, Temp:98.1 °F (36.7 °C), Temp src:Oral  06/06/24 2150, BP:117/64, Pulse:86, Resp:26, SpO2:93 %  06/06/24 2147, BP:137/70, Temp:98.3 °F (36.8 °C), Temp src:Oral, Pulse:88, Resp:18, SpO2:99 %  06/06/24 1929, BP:131/73, Temp:98.8 °F (37.1 °C), Pulse:81, Resp:20, SpO2:93 %  06/06/24 1831, BP:133/64, Pulse:83, Resp:22, SpO2:93 %  06/06/24 1728, BP:105/67, Pulse:83, Resp:19, SpO2:94 %  06/06/24 1658, BP:118/65, Pulse:83, Resp:23, SpO2:92 %  06/06/24 1603, BP:(!) 156/81, Pulse:86, Resp:21, SpO2:94 %  06/06/24 1529, BP:136/75, Pulse:86, Resp:22, SpO2:93 %  06/06/24 1429, BP:132/70, Pulse:87, Resp:24, SpO2:94 %  06/06/24 1359, BP:137/74, Pulse:84, Resp:23, SpO2:95 %  06/06/24 1259, BP:137/74, Pulse:80, Resp:19, SpO2:98 %  06/06/24

## 2024-06-08 VITALS
TEMPERATURE: 97.8 F | HEIGHT: 68 IN | OXYGEN SATURATION: 96 % | WEIGHT: 153 LBS | SYSTOLIC BLOOD PRESSURE: 147 MMHG | HEART RATE: 61 BPM | DIASTOLIC BLOOD PRESSURE: 77 MMHG | RESPIRATION RATE: 16 BRPM | BODY MASS INDEX: 23.19 KG/M2

## 2024-06-08 PROCEDURE — 51798 US URINE CAPACITY MEASURE: CPT

## 2024-06-08 PROCEDURE — 2580000003 HC RX 258: Performed by: SURGERY

## 2024-06-08 PROCEDURE — 6370000000 HC RX 637 (ALT 250 FOR IP): Performed by: PHYSICIAN ASSISTANT

## 2024-06-08 PROCEDURE — 99024 POSTOP FOLLOW-UP VISIT: CPT | Performed by: SURGERY

## 2024-06-08 PROCEDURE — 6360000002 HC RX W HCPCS: Performed by: SURGERY

## 2024-06-08 PROCEDURE — 94761 N-INVAS EAR/PLS OXIMETRY MLT: CPT

## 2024-06-08 PROCEDURE — 6370000000 HC RX 637 (ALT 250 FOR IP): Performed by: SURGERY

## 2024-06-08 PROCEDURE — 2700000000 HC OXYGEN THERAPY PER DAY

## 2024-06-08 RX ORDER — OXYCODONE HYDROCHLORIDE AND ACETAMINOPHEN 5; 325 MG/1; MG/1
1 TABLET ORAL EVERY 6 HOURS PRN
Qty: 20 TABLET | Refills: 0 | Status: SHIPPED | OUTPATIENT
Start: 2024-06-08 | End: 2024-06-13

## 2024-06-08 RX ORDER — AMOXICILLIN AND CLAVULANATE POTASSIUM 875; 125 MG/1; MG/1
1 TABLET, FILM COATED ORAL 2 TIMES DAILY
Qty: 14 TABLET | Refills: 0 | Status: SHIPPED | OUTPATIENT
Start: 2024-06-08 | End: 2024-06-15

## 2024-06-08 RX ORDER — TAMSULOSIN HYDROCHLORIDE 0.4 MG/1
0.4 CAPSULE ORAL DAILY
Qty: 30 CAPSULE | Refills: 0 | Status: SHIPPED | OUTPATIENT
Start: 2024-06-08

## 2024-06-08 RX ADMIN — LISINOPRIL 20 MG: 20 TABLET ORAL at 09:45

## 2024-06-08 RX ADMIN — ACETAMINOPHEN 650 MG: 325 TABLET ORAL at 12:25

## 2024-06-08 RX ADMIN — TAMSULOSIN HYDROCHLORIDE 0.4 MG: 0.4 CAPSULE ORAL at 09:45

## 2024-06-08 RX ADMIN — PIPERACILLIN AND TAZOBACTAM 3375 MG: 3; .375 INJECTION, POWDER, LYOPHILIZED, FOR SOLUTION INTRAVENOUS at 00:14

## 2024-06-08 RX ADMIN — OXYCODONE AND ACETAMINOPHEN 1 TABLET: 5; 325 TABLET ORAL at 09:45

## 2024-06-08 RX ADMIN — SODIUM CHLORIDE: 9 INJECTION, SOLUTION INTRAVENOUS at 06:10

## 2024-06-08 RX ADMIN — PIPERACILLIN AND TAZOBACTAM 3375 MG: 3; .375 INJECTION, POWDER, LYOPHILIZED, FOR SOLUTION INTRAVENOUS at 10:15

## 2024-06-08 RX ADMIN — HYDROCHLOROTHIAZIDE 12.5 MG: 25 TABLET ORAL at 09:43

## 2024-06-08 RX ADMIN — OXYCODONE AND ACETAMINOPHEN 1 TABLET: 5; 325 TABLET ORAL at 04:13

## 2024-06-08 RX ADMIN — ENOXAPARIN SODIUM 40 MG: 100 INJECTION SUBCUTANEOUS at 09:43

## 2024-06-08 ASSESSMENT — PAIN DESCRIPTION - ORIENTATION
ORIENTATION: RIGHT
ORIENTATION: RIGHT;UPPER

## 2024-06-08 ASSESSMENT — PAIN SCALES - GENERAL
PAINLEVEL_OUTOF10: 3
PAINLEVEL_OUTOF10: 6
PAINLEVEL_OUTOF10: 5

## 2024-06-08 ASSESSMENT — PAIN DESCRIPTION - DESCRIPTORS
DESCRIPTORS: ACHING;SPASM
DESCRIPTORS: SORE
DESCRIPTORS: ACHING

## 2024-06-08 ASSESSMENT — PAIN DESCRIPTION - LOCATION
LOCATION: ABDOMEN
LOCATION: ABDOMEN;INCISION
LOCATION: ABDOMEN

## 2024-06-08 NOTE — PLAN OF CARE
Problem: Pain  Goal: Verbalizes/displays adequate comfort level or baseline comfort level  Outcome: Progressing  Flowsheets (Taken 6/8/2024 1242)  Verbalizes/displays adequate comfort level or baseline comfort level:   Encourage patient to monitor pain and request assistance   Assess pain using appropriate pain scale   Administer analgesics based on type and severity of pain and evaluate response     Problem: Discharge Planning  Goal: Discharge to home or other facility with appropriate resources  Outcome: Progressing  Flowsheets (Taken 6/8/2024 1242)  Discharge to home or other facility with appropriate resources:   Identify barriers to discharge with patient and caregiver   Arrange for needed discharge resources and transportation as appropriate     Problem: ABCDS Injury Assessment  Goal: Absence of physical injury  Outcome: Progressing  Flowsheets (Taken 6/8/2024 1242)  Absence of Physical Injury: Implement safety measures based on patient assessment     Problem: Skin/Tissue Integrity  Goal: Absence of new skin breakdown  Description: 1.  Monitor for areas of redness and/or skin breakdown  2.  Assess vascular access sites hourly  3.  Every 4-6 hours minimum:  Change oxygen saturation probe site  4.  Every 4-6 hours:  If on nasal continuous positive airway pressure, respiratory therapy assess nares and determine need for appliance change or resting period.  Outcome: Progressing     Problem: Safety - Adult  Goal: Free from fall injury  Outcome: Progressing  Flowsheets (Taken 6/8/2024 1242)  Free From Fall Injury: Instruct family/caregiver on patient safety

## 2024-06-08 NOTE — DISCHARGE SUMMARY
Surgery Discharge Summary    Patient Identification  Ulisses Mcghee is a 68 y.o. male.  :  1955  Admit Date:  2024    Discharge date:   24                                     Disposition: home    Discharge Diagnoses:   Principal Problem:    Acute appendicitis with localized peritonitis, without perforation, abscess, or gangrene  Active Problems:    Acute perforated appendicitis    Acute appendicitis with perforation, localized peritonitis, abscess, and gangrene  Resolved Problems:    * No resolved hospital problems. *      Discharge condition: good    Discharge Medications:     Current Discharge Medication List        CONTINUE these medications which have NOT CHANGED    Details   hydroCHLOROthiazide 12.5 MG tablet Take 1 tablet by mouth daily      lisinopril (PRINIVIL;ZESTRIL) 20 MG tablet Refills: 1                Current Discharge Medication List        START taking these medications    Details   tamsulosin (FLOMAX) 0.4 MG capsule Take 1 capsule by mouth daily  Qty: 30 capsule, Refills: 0      amoxicillin-clavulanate (AUGMENTIN) 875-125 MG per tablet Take 1 tablet by mouth 2 times daily for 7 days  Qty: 14 tablet, Refills: 0      oxyCODONE-acetaminophen (ENDOCET) 5-325 MG per tablet Take 1 tablet by mouth every 6 hours as needed for Pain for up to 5 days. Intended supply: 5 days. Take lowest dose possible to manage pain Max Daily Amount: 4 tablets  Qty: 20 tablet, Refills: 0    Comments: Reduce doses taken as pain becomes manageable  Associated Diagnoses: Acute appendicitis with localized peritonitis, without perforation, abscess, or gangrene               Most Recent Labs:    CBC:   Recent Labs     24  1301 24  0619   WBC 13.1* 10.0   HGB 12.5* 12.3*   HCT 35.9* 35.7*    111*     BMP:    Recent Labs     24  1301 24  0619   * 132*   K 4.2 4.5   CL 91* 98*   CO2 28 24   BUN 11 12   CREATININE 0.9 1.0   GLUCOSE 112* 122*     Hepatic:   Recent Labs

## 2024-06-08 NOTE — CONSULTS
Consult Call Back    Who:Neelima Barr PA-C   Date:6/8/2024,  Time:7:22 AM    Electronically signed by Jayda Mak on 6/8/24 at 7:22 AM EDT

## 2024-06-08 NOTE — PLAN OF CARE
Problem: Discharge Planning  Goal: Discharge to home or other facility with appropriate resources  Outcome: Progressing  Flowsheets (Taken 6/7/2024 0330)  Discharge to home or other facility with appropriate resources:   Identify barriers to discharge with patient and caregiver   Arrange for needed discharge resources and transportation as appropriate   Arrange for interpreters to assist at discharge as needed   Identify discharge learning needs (meds, wound care, etc)     Problem: ABCDS Injury Assessment  Goal: Absence of physical injury  Outcome: Progressing  Flowsheets (Taken 6/7/2024 0330)  Absence of Physical Injury: Implement safety measures based on patient assessment     Problem: Safety - Adult  Goal: Free from fall injury  Outcome: Progressing  Flowsheets (Taken 6/7/2024 2111)  Free From Fall Injury: Instruct family/caregiver on patient safety     Problem: Pain  Goal: Verbalizes/displays adequate comfort level or baseline comfort level  Outcome: Not Progressing  Flowsheets (Taken 6/7/2024 0330)  Verbalizes/displays adequate comfort level or baseline comfort level:   Encourage patient to monitor pain and request assistance   Assess pain using appropriate pain scale   Administer analgesics based on type and severity of pain and evaluate response   Implement non-pharmacological measures as appropriate and evaluate response

## 2024-06-08 NOTE — PROGRESS NOTES
[facility] [place] GENERAL SURGERY    Surgery Progress Note           POD # 2    PATIENT NAME: Ulisses Mcghee     TODAY'S DATE: 6/8/2024    INTERVAL HISTORY:    Pt states he feels much better today.  He is passing gas and feels less distended.  He denies nausea.     OBJECTIVE:   VITALS:  BP (!) 159/82   Pulse 70   Temp 97.6 °F (36.4 °C) (Oral)   Resp 18   Ht 1.727 m (5' 8\")   Wt 69.4 kg (153 lb)   SpO2 95%   BMI 23.26 kg/m²     INTAKE/OUTPUT:    I/O last 3 completed shifts:  In: 3364.6 [I.V.:3120.9; IV Piggyback:243.7]  Out: 3590 [Urine:3450; Drains:140]  I/O this shift:  In: -   Out: 750 [Urine:750]              CONSTITUTIONAL:  awake and alert  LUNGS:  clear to auscultation  ABDOMEN:   hypoactive bowel sounds, soft, minimally distended, drain with murky drainage  INCISION: clean, dry    Data:  CBC:   Recent Labs     06/06/24  1301 06/07/24  0619   WBC 13.1* 10.0   HGB 12.5* 12.3*   HCT 35.9* 35.7*    111*     BMP:    Recent Labs     06/06/24  1301 06/07/24  0619   * 132*   K 4.2 4.5   CL 91* 98*   CO2 28 24   BUN 11 12   CREATININE 0.9 1.0   GLUCOSE 112* 122*     Hepatic:   Recent Labs     06/06/24  1301   AST 20   ALT 14   BILITOT 1.0   ALKPHOS 50     Mag:    No results for input(s): \"MG\" in the last 72 hours.   Phos:   No results for input(s): \"PHOS\" in the last 72 hours.   INR: No results for input(s): \"INR\" in the last 72 hours.      Radiology Review: N/A    ASSESSMENT AND PLAN:  68 y.o. male status post robotic appendectomy for perforated appendicitis.  Continue antibiotics and supportive care.  Slowly advance diet.  Try Zuniga removal today.  Discharge potentially later today if he is able to void or possibly tomorrow        Electronically signed by EM GRULLON MD   
       [facility] GENERAL SURGERY    Surgery Progress Note           POD # 1    PATIENT NAME: Ulisses Mcghee     TODAY'S DATE: 6/7/2024    INTERVAL HISTORY:    Pt  with abdominal pain, tenderness. Taking some clears, but not much appetite and some bloating.      OBJECTIVE:   VITALS:  /80   Pulse 64   Temp 98 °F (36.7 °C) (Oral)   Resp 18   Ht 1.727 m (5' 8\")   Wt 69.4 kg (153 lb)   SpO2 96%   BMI 23.26 kg/m²     INTAKE/OUTPUT:    I/O last 3 completed shifts:  In: 950 [I.V.:900; IV Piggyback:50]  Out: 2032 [Urine:1942; Drains:90]  No intake/output data recorded.              CONSTITUTIONAL:  awake and alert  ABDOMEN:    soft, mod distended, tenderness noted in the right lower quadrant, yvette drain serosanguinous   INCISION: clean, dry    Data:  CBC:   Recent Labs     06/06/24  1301 06/07/24  0619   WBC 13.1* 10.0   HGB 12.5* 12.3*   HCT 35.9* 35.7*    111*     BMP:    Recent Labs     06/06/24  1301 06/07/24  0619   * 132*   K 4.2 4.5   CL 91* 98*   CO2 28 24   BUN 11 12   CREATININE 0.9 1.0   GLUCOSE 112* 122*     Hepatic:   Recent Labs     06/06/24  1301   AST 20   ALT 14   BILITOT 1.0   ALKPHOS 50       ASSESSMENT AND PLAN:  68 y.o. male status post Laparoscopic robotic appendectomy secondary to perforated appendicitis     GI: continue with clears until increased GI function, monitor for PO ileus  : continue with mai, pt with urinary retention yesterday - will consult urology today as discussed with pt yesterday  Hyponatremia: improving  ID: continue with IV antibiotics given perforation  Activity: OOB to chair  Prophy: lovenox      Electronically signed by PAULINA Rao - FAM       Surgery Staff    I have examined this patient, and read and agree with the note by Chika Olivo CNP from today; more than half of the total time was spent by me on the encounter.     Urology recommends fill/pull the Mai prior to d/c from hospital.    He currently seems to have some degree of 
4 Eyes Skin Assessment     NAME:  Ulisses Mcghee  YOB: 1955  MEDICAL RECORD NUMBER:  [id number]    The patient is being assessed for  Admission    I agree that at least one RN has performed a thorough Head to Toe Skin Assessment on the patient. ALL assessment sites listed below have been assessed.      Areas assessed by both nurses: CHARLES Kumar/ TANIA Goss    Head, Face, Ears, Shoulders, Back, Chest, Arms, Elbows, Hands, Sacrum. Buttock, Coccyx, Ischium, Legs. Feet and Heels, and Under Medical Devices         Does the Patient have a Wound? 2 laps + EDIE drain on LUQ       [place] Prevention initiated by RN: Yes  Wound Care Orders initiated by RN: No    Pressure Injury (Stage 3,4, Unstageable, DTI, NWPT, and Complex wounds) if present, place Wound referral order by RN under : No    New Ostomies, if present place, Ostomy referral order under : No     Nurse 1 eSignature: Electronically signed by Stacy Downs RN on 6/7/24 at 3:10 AM EDT    **SHARE this note so that the co-signing nurse can place an eSignature**    Nurse 2 eSignature: Electronically signed by GEOVANY ARGUETA LPN on 6/7/24 at 3:16 AM EDT  
Discharge to home instructions given. Pt verbalized understanding. Pt discharged in stable condition. Pt transported to personal vehicle via wheel chair. All pt belongings given to patient.        
Patient arrived to PACU bay 7, phase one initiated. Placed on bedside monitor, VSS. Report obtained from OR RN and anesthesia. Patient on O2 via NRB at 6L. Assessment WNL. Warm blankets applied. Side rails in place, will monitor patient closely.   
Pt assessment completed and charted. VSS. Pt a/o. Pt c/o 8/10 pain. PRN medication administered per MAR. Zuniga catheter in place and patent. EDIE drain to bulb suction. Abd incisions C/D/I. Pt ambulating independently. Bed in lowest position and wheels locked. Call light within reach. Bedside table within reach. Non-skid socks in place. Pt denies any other needs at this time.  Pt calls out appropriately.  
Pt calm and cooperative.  No s/s of distress.  Alert and oriented x4.  Pt reports pain in abdomen.  Pt c/o his abdomen feeling firm.  Bulb drain in place.  Will continue to monitor.    
Pt mai cath removed per order at 0940. Pt tolerated well.     1037: writer emptied 200 mL of urine out of the urinal. Pt bladder scanned at this time. Pt noted to only have 26 mL in the bladder at this time. Pt had previoulsy urinated 100 mL in the urinal at 1017.    Will inform Dr. Scales.     1207: Perfect serve sent to Dr. Scales. Discharge order placed.   
Received patient from PACU, alert and oriented x4. Vitals are stable. PRN Dilaudid given for pain control. Presence of 2 laps + EDIE on LUQ. Tolerating clears. On oxygen support at 1LPM for comfort.    Call bell within reach. Refused SCDs. Mai in place, draining well. CHG and mai wipes done. Maintained a calm and comfortable environment. Shift assessment updated and documented.  
Received patient on bed, alert and oriented x4. Presence of 2 laps + EDIE on LUQ. Tolerating clears. Ambulating along the hallway.      Call bell within reach. Zuniga in place, draining well. Maintained a calm and comfortable environment. Shift assessment updated and documented.  
Shift assessment completed. Pt A&O. VSS.  Pain 5/10 acute abdomen.IV site patent and infusing. Patient on RA. Patient up walking unit and in room.  Medication given per MAR. Zuniga cath removed with no complications, or complaints. Denies any needs at this time. Bed locked and in lowest position.  Call light within reach. Shoes on. Patient is ready to be discharged today. Electronically signed by Jacinda Hooper RN on 6/8/2024 at 12:45 PM      
05-Jul-2018

## 2024-06-08 NOTE — DISCHARGE INSTRUCTIONS
[facility]    Crow Simmons M.D.   [facility] Office      [facility] Office        [facility]               [address]                [address]  Tony Gurrola M.D.              [address]           [address]          [place] [address]         [place] [address]  Ted Scales M.D                         [phone]        [facility]                   Lefty Martinez M.D.          [facility]       POST-OPERATIVE INSTRUCTIONS FOR APPENDIX SURGERY    Call the office to schedule your post-operative appointment with your surgeon for two (2) weeks.     You will have either white steri-strips and a water occlusive dressing or surgical glue closing your incisions.    If you have clear bandages over your incisions, you may remove them in 3-4 days.  Leave the steri-stips in place. These will peel away in 7-10 days.     You may not shower with the drain in place.  Wash incisions gently, and pat them dry. Do not rub your incisions.    General guidelines for activity:   Avoid strenuous activity or lifting anything heavier than 20 pounds.    It is OK to be up  walking around, and walking up and down stairs.     Do what is comfortable: stop and rest when you feel tired.   Drink plenty of fluids and stay on a bland diet for 2-3 days after surgery.     Do NOT drive while taking your narcotic pain medicine.     Watch for signs of infection:  Excessive warmth or bright redness around your incisions  Leakage cloudy fluid from you incisions  Fever over 101.5  During the laparoscopic procedure that you had, gas is pumped into the abdominal cavity.  You may feel abdominal, shoulder, or rib pain for a few days due to this gas.    You will have pain medicine ordered. Take as directed    If you experience constipation:  Increase your water intake  Increase your activity, walking is best.  A stool  well.      It is normal to have some fluid leak from around the drain; you should change the gauze dressing around your drain if it becomes wet. Call your surgeon if there is a lot of drainage from the site, and you have to change the dressing more than once a day.        Your drain will be removed in approximately 7-10 days after your surgery. The surgeon’s medical assistant will tell you when this can be removed.    Occasionally the drain may fall out on its own. If this happens, do not panic and call your surgeon’s office.       [facility] MAYCOL Simmons M.D.   [facility] Office      [facility] Office          [facility]               [address]                [address]  Tony Gurrola M.D.              [address]           [address]            [address] [address]  Ted Scales M.D                         [phone]          [facility]                   Lefty Martinez M.D.            [facility]        HOW TO EMPTY YOUR DRAIN    Pull the stopper out of the top of the drain.   Pour the fluid into the container you were provided.   After the drain is empty, squeeze the drain flat and push the stopper back into the top of the drain.   Remember to record the date, time, amount and color of fluid collected.     IF you see a change in color of your drain, please let the office know.                                        RECORD YOUR DRAIN OUTPUT(S) HERE:   DRAIN #1   r R  r L DRAIN # 2  r R  r L    Date/Time Amount  Color Date/Time Amount  Color

## 2024-06-08 NOTE — PLAN OF CARE
Problem: Pain  Goal: Verbalizes/displays adequate comfort level or baseline comfort level  6/8/2024 1252 by Fallon Gentile, RN  Outcome: Completed     Problem: Discharge Planning  Goal: Discharge to home or other facility with appropriate resources  6/8/2024 1252 by Fallon Gentile, RN  Outcome: Completed     Problem: ABCDS Injury Assessment  Goal: Absence of physical injury  6/8/2024 1252 by Fallon Gentile, RN  Outcome: Completed     Problem: Skin/Tissue Integrity  Goal: Absence of new skin breakdown  Description: 1.  Monitor for areas of redness and/or skin breakdown  2.  Assess vascular access sites hourly  3.  Every 4-6 hours minimum:  Change oxygen saturation probe site  4.  Every 4-6 hours:  If on nasal continuous positive airway pressure, respiratory therapy assess nares and determine need for appliance change or resting period.  6/8/2024 1252 by Fallon Gentile, RN  Outcome: Completed     Problem: Safety - Adult  Goal: Free from fall injury  6/8/2024 1252 by Fallon Gentile, RN  Outcome: Completed

## 2024-06-17 ENCOUNTER — OFFICE VISIT (OUTPATIENT)
Dept: SURGERY | Age: 69
End: 2024-06-17

## 2024-06-17 VITALS
BODY MASS INDEX: 22.58 KG/M2 | DIASTOLIC BLOOD PRESSURE: 78 MMHG | WEIGHT: 149 LBS | SYSTOLIC BLOOD PRESSURE: 136 MMHG | HEIGHT: 68 IN

## 2024-06-17 DIAGNOSIS — Z98.890 POST-OPERATIVE STATE: Primary | ICD-10-CM

## 2024-06-17 PROCEDURE — 99024 POSTOP FOLLOW-UP VISIT: CPT | Performed by: SURGERY

## 2024-06-17 NOTE — PROGRESS NOTES
[facility] GENERAL SURGERY      S:   Patient presents s/p robotic appendectomy for perforated appendicitis.  He reports doing well.    O:   Comfortable         Incision sites healing well.  Drain with minimal serous drainage.  It was removed              A:   S/P robotic appendectomy for perforated appendicitis    P:   Follow up as needed.

## (undated) DEVICE — SUTURE VICRYL + SZ 3-0 L18IN ABSRB UD SH 1/2 CIR TAPERCUT NDL VCP864D

## (undated) DEVICE — SUTURE VICRYL + SZ 0 L27IN ABSRB VLT L26MM UR-6 5/8 CIR VCP603H

## (undated) DEVICE — Device

## (undated) DEVICE — SEAL

## (undated) DEVICE — FENESTRATED BIPOLAR FORCEPS: Brand: ENDOWRIST

## (undated) DEVICE — TISSUE RETRIEVAL SYSTEM: Brand: INZII RETRIEVAL SYSTEM

## (undated) DEVICE — CADIERE FORCEPS: Brand: ENDOWRIST

## (undated) DEVICE — GLOVE,SURG,SENSICARE SLT,LF,PF,7: Brand: MEDLINE

## (undated) DEVICE — COLUMN DRAPE

## (undated) DEVICE — STRAP POS W5XL72IN FOAM KNEE AND BODY HK LOOP CLSR DISP

## (undated) DEVICE — TIP COVER ACCESSORY

## (undated) DEVICE — PUMP SUC IRR TBNG L10FT W/ HNDPC ASSEMB STRYKEFLOW 2

## (undated) DEVICE — SUTURE PERMA-HAND SZ 2-0 L30IN NONABSORBABLE BLK L26MM SH K833H

## (undated) DEVICE — DRAIN WND SIL FLAT RADIOPAQUE 10MM FULL FLUTED

## (undated) DEVICE — RESERVOIR,SUCTION,100CC,SILICONE: Brand: MEDLINE

## (undated) DEVICE — REDUCER: Brand: ENDOWRIST

## (undated) DEVICE — TROCAR: Brand: KII FIOS FIRST ENTRY

## (undated) DEVICE — SUCTION IRRIGATOR: Brand: ENDOWRIST

## (undated) DEVICE — BLADELESS OBTURATOR: Brand: WECK VISTA

## (undated) DEVICE — ARM DRAPE

## (undated) DEVICE — SCISSORS SURG DIA8MM MPLR CRV ENDOWRIST